# Patient Record
Sex: FEMALE | Race: BLACK OR AFRICAN AMERICAN | NOT HISPANIC OR LATINO | Employment: FULL TIME | URBAN - METROPOLITAN AREA
[De-identification: names, ages, dates, MRNs, and addresses within clinical notes are randomized per-mention and may not be internally consistent; named-entity substitution may affect disease eponyms.]

---

## 2018-05-27 ENCOUNTER — HOSPITAL ENCOUNTER (EMERGENCY)
Facility: OTHER | Age: 53
Discharge: HOME | End: 2018-05-27
Attending: EMERGENCY MEDICINE
Payer: MEDICAID

## 2018-05-27 VITALS
HEART RATE: 84 BPM | WEIGHT: 182 LBS | BODY MASS INDEX: 28.56 KG/M2 | HEIGHT: 67 IN | RESPIRATION RATE: 16 BRPM | OXYGEN SATURATION: 98 % | DIASTOLIC BLOOD PRESSURE: 74 MMHG | TEMPERATURE: 98 F | SYSTOLIC BLOOD PRESSURE: 118 MMHG

## 2018-05-27 DIAGNOSIS — K62.5 RECTAL BLEEDING: Primary | ICD-10-CM

## 2018-05-27 DIAGNOSIS — K64.9 HEMORRHOIDS, UNSPECIFIED HEMORRHOID TYPE: ICD-10-CM

## 2018-05-27 LAB
ABO + RH BLD: NORMAL
ALBUMIN SERPL BCP-MCNC: 3.7 G/DL
ALP SERPL-CCNC: 108 U/L
ALT SERPL W/O P-5'-P-CCNC: 14 U/L
ANION GAP SERPL CALC-SCNC: 11 MMOL/L
APTT BLDCRRT: 31.1 SEC
AST SERPL-CCNC: 16 U/L
BASOPHILS # BLD AUTO: 0.03 K/UL
BASOPHILS NFR BLD: 0.3 %
BILIRUB SERPL-MCNC: 0.3 MG/DL
BLD GP AB SCN CELLS X3 SERPL QL: NORMAL
BUN SERPL-MCNC: 13 MG/DL
CALCIUM SERPL-MCNC: 9.7 MG/DL
CHLORIDE SERPL-SCNC: 107 MMOL/L
CO2 SERPL-SCNC: 23 MMOL/L
CREAT SERPL-MCNC: 0.7 MG/DL
DIFFERENTIAL METHOD: NORMAL
EOSINOPHIL # BLD AUTO: 0.1 K/UL
EOSINOPHIL NFR BLD: 1.5 %
ERYTHROCYTE [DISTWIDTH] IN BLOOD BY AUTOMATED COUNT: 14.1 %
EST. GFR  (AFRICAN AMERICAN): >60 ML/MIN/1.73 M^2
EST. GFR  (NON AFRICAN AMERICAN): >60 ML/MIN/1.73 M^2
GLUCOSE SERPL-MCNC: 109 MG/DL
HCT VFR BLD AUTO: 38.9 %
HGB BLD-MCNC: 12.9 G/DL
INR PPP: 0.9
LYMPHOCYTES # BLD AUTO: 3.4 K/UL
LYMPHOCYTES NFR BLD: 39 %
MCH RBC QN AUTO: 29.3 PG
MCHC RBC AUTO-ENTMCNC: 33.2 G/DL
MCV RBC AUTO: 88 FL
MONOCYTES # BLD AUTO: 0.6 K/UL
MONOCYTES NFR BLD: 7.2 %
NEUTROPHILS # BLD AUTO: 4.5 K/UL
NEUTROPHILS NFR BLD: 51.8 %
PLATELET # BLD AUTO: 287 K/UL
PMV BLD AUTO: 11 FL
POTASSIUM SERPL-SCNC: 3.7 MMOL/L
PROT SERPL-MCNC: 7.8 G/DL
PROTHROMBIN TIME: 9.8 SEC
RBC # BLD AUTO: 4.41 M/UL
SODIUM SERPL-SCNC: 141 MMOL/L
WBC # BLD AUTO: 8.63 K/UL

## 2018-05-27 PROCEDURE — 85730 THROMBOPLASTIN TIME PARTIAL: CPT

## 2018-05-27 PROCEDURE — 86850 RBC ANTIBODY SCREEN: CPT

## 2018-05-27 PROCEDURE — 80053 COMPREHEN METABOLIC PANEL: CPT

## 2018-05-27 PROCEDURE — 99283 EMERGENCY DEPT VISIT LOW MDM: CPT

## 2018-05-27 PROCEDURE — 85610 PROTHROMBIN TIME: CPT

## 2018-05-27 PROCEDURE — 85025 COMPLETE CBC W/AUTO DIFF WBC: CPT

## 2018-05-27 RX ORDER — POLYETHYLENE GLYCOL 3350 17 G/17G
17 POWDER, FOR SOLUTION ORAL DAILY
Qty: 238 G | Refills: 0 | Status: SHIPPED | OUTPATIENT
Start: 2018-05-27 | End: 2018-06-10

## 2018-05-27 RX ORDER — AMLODIPINE BESYLATE 10 MG/1
10 TABLET ORAL DAILY
COMMUNITY

## 2018-05-28 NOTE — ED NOTES
"Pt presented to ED via POV with complaints of bright red rectal bleeding x "couple months" pta. Pt reports being seen by PCP and referred to GI but "could not make the appt because of family emergency". On arrival pt appears calm and cooperative RR easy non labored, NAD. AAOX4, VSS. MD at bedside for rectal exam, Hemorrhoids noted on exam without s/s of anemia. Denies chest pain or SOB. ABD soft non tender. Side rails up x2, call light within reach, bed in lowest locked position, will continue to monitor and assess for changes.   "

## 2023-05-17 PROBLEM — J02.0 PHARYNGITIS DUE TO STREPTOCOCCUS SPECIES: Status: ACTIVE | Noted: 2023-05-17

## 2024-05-13 DIAGNOSIS — Z87.891 HISTORY OF SMOKING: Primary | ICD-10-CM

## 2024-06-16 NOTE — ED PROVIDER NOTES
"Encounter Date: 5/27/2018    SCRIBE #1 NOTE: I, Carolyne Antwon, am scribing for, and in the presence of, Dr. Mckeon.       History     Chief Complaint   Patient presents with    Rectal Bleeding     Pt reports constipation and bright red rectal bleeding with bowel movements x couple of months that has been worsening over the past 3 days. "My stomach bloated too." +abd pain, nausea. Denies vomiting/fever.     Time seen by provider: 6:53 PM    This is a 52 y.o. female who presents with complaint of rectal bleeding that began three days ago. Patient reports bright red rectal bleeding which occurs with bowel movements with associated rectal pain. She reports chronic abdominal pain that worsened about one month ago. Pain is located to the left lower abdomen. She states abdominal pain improves after having bowel movements. She reports back pain that began one day ago similar to previous back pain. She reports intermittent episodes of nausea. She has dizziness that occurs with having bowel movements. She reports taking stool softeners with little relief. She denies shortness of breath, chest pain, palpitations, vomiting, or difficulty urinating. She reports previous hemorrhoids. Patient has history of asthma and HTN. She admits to tobacco use of 2 to 3 cigarettes per day. She denies alcohol or drug use.      The history is provided by the patient.     Review of patient's allergies indicates:  No Known Allergies  Past Medical History:   Diagnosis Date    Asthma     Hypertension      History reviewed. No pertinent surgical history.  History reviewed. No pertinent family history.  Social History   Substance Use Topics    Smoking status: Current Every Day Smoker     Types: Cigarettes    Smokeless tobacco: Not on file    Alcohol use No     Review of Systems   Constitutional: Negative for chills and fever.   HENT: Negative for congestion and sore throat.    Eyes: Negative for visual disturbance.   Respiratory: Negative for " 26.0 - 35.0 pg    MCHC 34.4 32.0 - 34.5 g/dL    RDW 11.8 11.5 - 15.0 %    Platelets 262 130 - 450 k/uL    MPV 9.5 7.0 - 12.0 fL    Neutrophils % 84 (H) 43.0 - 80.0 %    Lymphocytes % 10 (L) 20.0 - 42.0 %    Monocytes % 4 2.0 - 12.0 %    Eosinophils % 0 0 - 6 %    Basophils % 1 0.0 - 2.0 %    Immature Granulocytes % 1 0.0 - 5.0 %    Neutrophils Absolute 9.22 (H) 1.80 - 7.30 k/uL    Lymphocytes Absolute 1.14 (L) 1.50 - 4.00 k/uL    Monocytes Absolute 0.43 0.10 - 0.95 k/uL    Eosinophils Absolute 0.03 (L) 0.05 - 0.50 k/uL    Basophils Absolute 0.05 0.00 - 0.20 k/uL    Immature Granulocytes Absolute 0.05 0.00 - 0.58 k/uL   Comprehensive Metabolic Panel   Result Value Ref Range    Sodium 133 132 - 146 mmol/L    Potassium 3.9 3.5 - 5.0 mmol/L    Chloride 98 98 - 107 mmol/L    CO2 17 (L) 22 - 29 mmol/L    Anion Gap 18 (H) 7 - 16 mmol/L    Glucose 122 (H) 74 - 99 mg/dL    BUN 10 6 - 20 mg/dL    Creatinine 0.9 0.70 - 1.20 mg/dL    Est, Glom Filt Rate >90 >60 mL/min/1.73m2    Calcium 8.3 (L) 8.6 - 10.2 mg/dL    Total Protein 7.2 6.4 - 8.3 g/dL    Albumin 4.3 3.5 - 5.2 g/dL    Total Bilirubin 0.4 0.0 - 1.2 mg/dL    Alkaline Phosphatase 73 40 - 129 U/L    ALT 24 0 - 40 U/L    AST 19 0 - 39 U/L   Serum Drug Screen   Result Value Ref Range    Acetaminophen Level <5 (L) 10.0 - 30.0 ug/mL    Ethanol Lvl 194 (H) <10 mg/dL    Salicylate Lvl <0.3 0.0 - 30.0 mg/dL    Toxic Tricyclic Sc,Blood NEGATIVE NEGATIVE   EKG 12 Lead   Result Value Ref Range    Ventricular Rate 78 BPM    Atrial Rate 78 BPM    P-R Interval 214 ms    QRS Duration 104 ms    Q-T Interval 444 ms    QTc Calculation (Bazett) 506 ms    P Axis 55 degrees    R Axis -30 degrees    T Axis 32 degrees       RADIOLOGY:  Interpreted by Radiologist.  No orders to display       ------------------------- NURSING NOTES AND VITALS REVIEWED ---------------------------   The nursing notes within the ED encounter and vital signs as below have been reviewed.   /86   Pulse 87    cough and shortness of breath.    Cardiovascular: Negative for chest pain and palpitations.   Gastrointestinal: Positive for abdominal pain, anal bleeding, nausea and rectal pain. Negative for constipation, diarrhea and vomiting.   Genitourinary: Negative for decreased urine volume, difficulty urinating, dysuria and vaginal discharge.   Musculoskeletal: Positive for back pain. Negative for joint swelling, neck pain and neck stiffness.   Skin: Negative for rash and wound.   Neurological: Positive for dizziness. Negative for weakness, light-headedness, numbness and headaches.   Psychiatric/Behavioral: Negative for confusion.       Physical Exam     Initial Vitals [05/27/18 1737]   BP Pulse Resp Temp SpO2   (!) 144/97 102 16 98.3 °F (36.8 °C) 98 %      MAP       112.67         Physical Exam    Nursing note and vitals reviewed.  Constitutional: She appears well-developed and well-nourished. No distress.   HENT:   Head: Normocephalic and atraumatic.   Right Ear: External ear normal.   Left Ear: External ear normal.   Nose: Nose normal.   Mouth/Throat: Oropharynx is clear and moist.   Eyes: Conjunctivae and EOM are normal. Pupils are equal, round, and reactive to light.   Neck: Normal range of motion.   Cardiovascular: Normal rate, regular rhythm and normal heart sounds. Exam reveals no gallop and no friction rub.    No murmur heard.  Pulmonary/Chest: Breath sounds normal. No respiratory distress. She has no wheezes. She has no rhonchi. She has no rales.   Abdominal: Soft. There is no tenderness. There is no rebound and no guarding.   Genitourinary: Rectal exam shows guaiac positive stool. Guaiac positive stool. : Acceptable.  Genitourinary Comments: Internal and external hemorrhoids present. External hemorrhoids have no recent thrombosis. Red colored stool in the vault.    Musculoskeletal: Normal range of motion. She exhibits no edema or tenderness.   Neurological: She is alert and oriented to person,  place, and time. She has normal strength.   Skin: Skin is warm and dry.   Psychiatric: She has a normal mood and affect. Her behavior is normal. Judgment and thought content normal.       ED Course   Procedures  Labs Reviewed   CBC W/ AUTO DIFFERENTIAL   COMPREHENSIVE METABOLIC PANEL   PROTIME-INR   APTT   TYPE & SCREEN             Medical Decision Making:   Clinical Tests:   Lab Tests: Ordered and Reviewed  ED Management:  Emergent evaluation a 52-year-old female with multiple complaints including rectal bleeding.  Vital signs are benign, afebrile.  Physical exam revealed internal and external hemorrhoids, blood-tinged stool is guaiac positive. There was no abdominal tenderness or neurologic deficit.  Patient states symptoms have been ongoing for over a week, but labs are quite reassuring with no anemia, no leukocytosis, no electrolyte disturbance or renal insufficiency.  I am very comfortable with discharge home and close follow-up.  She did complain of some chronic left-sided abdominal pain, but there was no tenderness on exam and I do not think this needs to be worked up in the ED tonight.  She is advised to follow this up with Gastroenterology.  She also desires follow-up for hemorrhoids is given contact information for colon and rectal surgery.  She is encouraged to follow up with her PCP and return to the ED for worsening.              Attending Attestation:           Physician Attestation for Scribe:  Physician Attestation Statement for Scribe #1: I, Dr. Mckeon, reviewed documentation, as scribed by Carolyne Kapoor in my presence, and it is both accurate and complete.                    Clinical Impression:     1. Rectal bleeding    2. Hemorrhoids, unspecified hemorrhoid type                               Aun Mckeon MD  06/01/18 0047

## 2025-03-17 DIAGNOSIS — Z12.31 ENCOUNTER FOR SCREENING MAMMOGRAM FOR MALIGNANT NEOPLASM OF BREAST: Primary | ICD-10-CM

## 2025-04-01 ENCOUNTER — HOSPITAL ENCOUNTER (OUTPATIENT)
Dept: RADIOLOGY | Facility: OTHER | Age: 60
Discharge: HOME | End: 2025-04-01
Attending: FAMILY MEDICINE
Payer: MEDICAID

## 2025-04-01 DIAGNOSIS — Z12.31 ENCOUNTER FOR SCREENING MAMMOGRAM FOR MALIGNANT NEOPLASM OF BREAST: ICD-10-CM

## 2025-04-01 PROCEDURE — 77067 SCR MAMMO BI INCL CAD: CPT | Mod: TC
